# Patient Record
Sex: MALE | Race: WHITE | NOT HISPANIC OR LATINO | URBAN - METROPOLITAN AREA
[De-identification: names, ages, dates, MRNs, and addresses within clinical notes are randomized per-mention and may not be internally consistent; named-entity substitution may affect disease eponyms.]

---

## 2021-10-13 ENCOUNTER — OFFICE VISIT (OUTPATIENT)
Dept: URBAN - METROPOLITAN AREA CLINIC 121 | Facility: CLINIC | Age: 71
End: 2021-10-13

## 2021-10-20 ENCOUNTER — OFFICE VISIT (OUTPATIENT)
Dept: URBAN - METROPOLITAN AREA CLINIC 63 | Facility: CLINIC | Age: 71
End: 2021-10-20

## 2021-11-29 ENCOUNTER — OFFICE VISIT (OUTPATIENT)
Dept: URBAN - METROPOLITAN AREA MEDICAL CENTER 3 | Facility: MEDICAL CENTER | Age: 71
End: 2021-11-29

## 2021-12-07 ENCOUNTER — OFFICE VISIT (OUTPATIENT)
Dept: URBAN - METROPOLITAN AREA CLINIC 63 | Facility: CLINIC | Age: 71
End: 2021-12-07

## 2022-01-18 ENCOUNTER — OFFICE VISIT (OUTPATIENT)
Dept: URBAN - METROPOLITAN AREA CLINIC 63 | Facility: CLINIC | Age: 72
End: 2022-01-18

## 2022-04-28 ENCOUNTER — OFFICE VISIT (OUTPATIENT)
Dept: URBAN - METROPOLITAN AREA CLINIC 63 | Facility: CLINIC | Age: 72
End: 2022-04-28

## 2022-07-09 ENCOUNTER — TELEPHONE ENCOUNTER (OUTPATIENT)
Dept: URBAN - METROPOLITAN AREA CLINIC 121 | Facility: CLINIC | Age: 72
End: 2022-07-09

## 2022-07-09 RX ORDER — ICOSAPENT ETHYL 1000 MG/1
CAPSULE ORAL
Refills: 0 | OUTPATIENT
Start: 2021-12-07 | End: 2022-01-18

## 2022-07-09 RX ORDER — OMEPRAZOLE 40 MG/1
CAPSULE, DELAYED RELEASE ORAL ONCE A DAY
Refills: 0 | OUTPATIENT
Start: 2021-06-15 | End: 2021-10-20

## 2022-07-09 RX ORDER — METOPROLOL TARTRATE 50 MG/1
TABLET, FILM COATED ORAL ONCE A DAY
Refills: 0 | OUTPATIENT
Start: 2021-09-30 | End: 2021-10-20

## 2022-07-09 RX ORDER — ATORVASTATIN CALCIUM 80 MG/1
TABLET, FILM COATED ORAL ONCE A DAY
Refills: 0 | OUTPATIENT
Start: 2021-10-06 | End: 2021-10-20

## 2022-07-09 RX ORDER — NITROGLYCERIN 0.4 MG/1
TABLET SUBLINGUAL ONCE A DAY
Refills: 0 | OUTPATIENT
Start: 2021-09-29 | End: 2021-10-20

## 2022-07-09 RX ORDER — ATORVASTATIN CALCIUM 80 MG/1
TABLET, FILM COATED ORAL ONCE A DAY
Refills: 0 | OUTPATIENT
Start: 2021-10-20 | End: 2021-12-07

## 2022-07-09 RX ORDER — BENZONATATE 100 MG
200 MG CAPSULE ORAL
Refills: 0 | OUTPATIENT
Start: 2022-04-28 | End: 2022-04-28

## 2022-07-09 RX ORDER — PREDNISONE 20 MG/1
TABLET ORAL
Refills: 0 | OUTPATIENT
Start: 2021-10-26 | End: 2021-12-07

## 2022-07-09 RX ORDER — OMEPRAZOLE 40 MG/1
CAPSULE, DELAYED RELEASE ORAL ONCE A DAY
Refills: 0 | OUTPATIENT
Start: 2021-10-20 | End: 2021-12-07

## 2022-07-09 RX ORDER — ICOSAPENT ETHYL 1000 MG/1
CAPSULE ORAL ONCE A DAY
Refills: 0 | OUTPATIENT
Start: 2021-10-01 | End: 2021-10-20

## 2022-07-09 RX ORDER — ATORVASTATIN CALCIUM 80 MG/1
TABLET, FILM COATED ORAL ONCE A DAY
Refills: 0 | OUTPATIENT
Start: 2021-12-07 | End: 2022-01-18

## 2022-07-09 RX ORDER — ICOSAPENT ETHYL 1000 MG/1
CAPSULE ORAL
Refills: 0 | OUTPATIENT
Start: 2021-11-04 | End: 2021-12-07

## 2022-07-09 RX ORDER — BENZONATATE 100 MG
200 MG CAPSULE ORAL
Refills: 0 | OUTPATIENT
Start: 2022-01-18 | End: 2022-04-28

## 2022-07-09 RX ORDER — ICOSAPENT ETHYL 1000 MG/1
CAPSULE ORAL ONCE A DAY
Refills: 0 | OUTPATIENT
Start: 2022-04-28 | End: 2022-04-28

## 2022-07-09 RX ORDER — METOPROLOL TARTRATE 50 MG/1
TABLET, FILM COATED ORAL ONCE A DAY
Refills: 0 | OUTPATIENT
Start: 2021-10-20 | End: 2021-12-07

## 2022-07-09 RX ORDER — OMEPRAZOLE 20 MG/1
ONCE A DAY CAPSULE, DELAYED RELEASE ORAL ONCE A DAY
Refills: 1 | OUTPATIENT
Start: 2021-10-20 | End: 2022-04-28

## 2022-07-10 ENCOUNTER — TELEPHONE ENCOUNTER (OUTPATIENT)
Dept: URBAN - METROPOLITAN AREA CLINIC 121 | Facility: CLINIC | Age: 72
End: 2022-07-10

## 2022-07-10 RX ORDER — RANOLAZINE 500 MG/1
TABLET, EXTENDED RELEASE ORAL ONCE A DAY
Refills: 0 | Status: ACTIVE | COMMUNITY
Start: 2022-04-28

## 2022-07-10 RX ORDER — NITROGLYCERIN 0.4 MG/1
TABLET SUBLINGUAL ONCE A DAY
Refills: 0 | Status: ACTIVE | COMMUNITY
Start: 2021-10-20

## 2022-07-10 RX ORDER — ATORVASTATIN CALCIUM 80 MG/1
TABLET, FILM COATED ORAL ONCE A DAY
Refills: 0 | Status: ACTIVE | COMMUNITY
Start: 2022-01-18

## 2022-07-10 RX ORDER — METOPROLOL TARTRATE 25 MG/1
TABLET, FILM COATED ORAL ONCE A DAY
Refills: 0 | Status: ACTIVE | COMMUNITY
Start: 2022-04-28

## 2022-07-10 RX ORDER — ATORVASTATIN CALCIUM 80 MG/1
TABLET, FILM COATED ORAL ONCE A DAY
Refills: 0 | Status: ACTIVE | COMMUNITY
Start: 2022-04-28

## 2022-07-10 RX ORDER — METOPROLOL TARTRATE 50 MG/1
TABLET, FILM COATED ORAL ONCE A DAY
Refills: 0 | Status: ACTIVE | COMMUNITY
Start: 2021-12-07

## 2022-07-10 RX ORDER — ICOSAPENT ETHYL 1000 MG/1
CAPSULE ORAL ONCE A DAY
Refills: 0 | Status: ACTIVE | COMMUNITY
Start: 2021-10-20

## 2022-07-10 RX ORDER — PANTOPRAZOLE SODIUM 40 MG/1
TABLET, DELAYED RELEASE ORAL ONCE A DAY
Refills: 0 | Status: ACTIVE | COMMUNITY
Start: 2022-04-28

## 2023-02-14 ENCOUNTER — TELEPHONE ENCOUNTER (OUTPATIENT)
Dept: URBAN - METROPOLITAN AREA CLINIC 7 | Facility: CLINIC | Age: 73
End: 2023-02-14

## 2023-07-05 ENCOUNTER — TELEPHONE ENCOUNTER (OUTPATIENT)
Dept: URBAN - METROPOLITAN AREA CLINIC 64 | Facility: CLINIC | Age: 73
End: 2023-07-05

## 2023-08-08 ENCOUNTER — OFFICE VISIT (OUTPATIENT)
Dept: URBAN - METROPOLITAN AREA CLINIC 63 | Facility: CLINIC | Age: 73
End: 2023-08-08

## 2023-08-22 PROBLEM — 197321007: Status: ACTIVE | Noted: 2023-08-22

## 2023-08-23 ENCOUNTER — OFFICE VISIT (OUTPATIENT)
Dept: URBAN - METROPOLITAN AREA CLINIC 63 | Facility: CLINIC | Age: 73
End: 2023-08-23

## 2023-08-24 ENCOUNTER — OFFICE VISIT (OUTPATIENT)
Dept: URBAN - METROPOLITAN AREA CLINIC 63 | Facility: CLINIC | Age: 73
End: 2023-08-24

## 2023-08-29 ENCOUNTER — LAB OUTSIDE AN ENCOUNTER (OUTPATIENT)
Dept: URBAN - METROPOLITAN AREA CLINIC 63 | Facility: CLINIC | Age: 73
End: 2023-08-29

## 2023-08-29 ENCOUNTER — OFFICE VISIT (OUTPATIENT)
Dept: URBAN - METROPOLITAN AREA CLINIC 63 | Facility: CLINIC | Age: 73
End: 2023-08-29
Payer: MEDICARE

## 2023-08-29 ENCOUNTER — WEB ENCOUNTER (OUTPATIENT)
Dept: URBAN - METROPOLITAN AREA CLINIC 63 | Facility: CLINIC | Age: 73
End: 2023-08-29

## 2023-08-29 VITALS
TEMPERATURE: 96 F | HEART RATE: 64 BPM | SYSTOLIC BLOOD PRESSURE: 118 MMHG | BODY MASS INDEX: 34.44 KG/M2 | OXYGEN SATURATION: 96 % | DIASTOLIC BLOOD PRESSURE: 80 MMHG | WEIGHT: 240.6 LBS | HEIGHT: 70 IN

## 2023-08-29 DIAGNOSIS — K76.0 FATTY LIVER: ICD-10-CM

## 2023-08-29 PROCEDURE — 99213 OFFICE O/P EST LOW 20 MIN: CPT | Performed by: PHYSICIAN ASSISTANT

## 2023-08-29 RX ORDER — METOPROLOL TARTRATE 25 MG/1
TABLET, FILM COATED ORAL ONCE A DAY
Refills: 0 | Status: ACTIVE | COMMUNITY
Start: 2022-04-28

## 2023-08-29 RX ORDER — ATORVASTATIN CALCIUM 80 MG/1
TABLET, FILM COATED ORAL ONCE A DAY
Refills: 0 | Status: ACTIVE | COMMUNITY
Start: 2022-04-28

## 2023-08-29 RX ORDER — PANTOPRAZOLE SODIUM 40 MG/1
TABLET, DELAYED RELEASE ORAL ONCE A DAY
Refills: 0 | Status: ACTIVE | COMMUNITY
Start: 2022-04-28

## 2023-08-29 RX ORDER — NITROGLYCERIN 0.4 MG/1
TABLET SUBLINGUAL ONCE A DAY
Refills: 0 | Status: ACTIVE | COMMUNITY
Start: 2021-10-20

## 2023-08-29 RX ORDER — ICOSAPENT ETHYL 1000 MG/1
CAPSULE ORAL ONCE A DAY
Refills: 0 | Status: ACTIVE | COMMUNITY
Start: 2021-10-20

## 2023-08-29 RX ORDER — RANOLAZINE 500 MG/1
TABLET, EXTENDED RELEASE ORAL ONCE A DAY
Refills: 0 | Status: ACTIVE | COMMUNITY
Start: 2022-04-28

## 2023-08-29 NOTE — HPI-TODAY'S VISIT:
Dl is a pleasant 73 year old male who presents today for a follow up   Labs dated 10/24/2022 showed a normal CBC.  Normal renal function.  Normal LFTs.  TSH normal.  Vitamin B12 normal.  Folate normal.  HCV negative   Labs dated 3/22/2022 showed a normal CBC.  PT/INR normal.  Glucose 107 but normal renal function.  Normal LFTs.  Hep A antibody IgM negative.  Hep C antibody negative.  Hep A antibody total nonreactive.    Ultrasound dated 12/30/2021 demonstrated coarsened hepatic echotexture indicative of diffuse hepatocellular disease such as fatty infiltration.  No other abnormality    FibroScan dated 12/29/2021 showed S1 and F0    Colonoscopy dated 11/29/2021 showed a redundant colon.  Diminutive tubular adenoma removed from the mid ascending colon.  Diminutive tubular adenoma removed from the hepatic flexure.  Mild internal hemorrhoids on retroflexion.    EGD in October 2020 which revealed LA grade A reflux esophagitis.  Irregular Z-line.  Small hiatal hernia.  Erythematous mucosa in the antrum and prepyloric region.  Erythematous duodenopathy.  Biopsies revealed chronic reflux esophagogastritis with no evidence of intestinal metaplasia, dysplasia or malignancy. Reviewed with Dr. Haywood and no plans to repeat endoscopy at this time    Chronic reflux well controlled on famotidine 20 mg PRN. Following with ENT and dysphonia improved. Notes 1-2 daily bowel movements without constipation or diarrhea. Denies any nausea, vomiting, dysphagia, odynophagia, hematemesis, coffee ground emesis, abdominal pain, change in bowel habits, abnormal weight loss, BRBPR or melena. Denies any family history of colon cancer or colon polyps. Notes no other GI complaints at this time

## 2023-10-27 ENCOUNTER — OFFICE VISIT (OUTPATIENT)
Dept: URBAN - METROPOLITAN AREA CLINIC 63 | Facility: CLINIC | Age: 73
End: 2023-10-27
Payer: MEDICARE

## 2023-10-27 ENCOUNTER — LAB OUTSIDE AN ENCOUNTER (OUTPATIENT)
Dept: URBAN - METROPOLITAN AREA CLINIC 63 | Facility: CLINIC | Age: 73
End: 2023-10-27

## 2023-10-27 VITALS
BODY MASS INDEX: 34.07 KG/M2 | DIASTOLIC BLOOD PRESSURE: 70 MMHG | HEART RATE: 62 BPM | SYSTOLIC BLOOD PRESSURE: 117 MMHG | TEMPERATURE: 97.6 F | HEIGHT: 70 IN | WEIGHT: 238 LBS | OXYGEN SATURATION: 95 %

## 2023-10-27 DIAGNOSIS — K76.0 FATTY LIVER: ICD-10-CM

## 2023-10-27 DIAGNOSIS — K74.01 HEPATIC FIBROSIS, EARLY FIBROSIS: ICD-10-CM

## 2023-10-27 PROCEDURE — 99214 OFFICE O/P EST MOD 30 MIN: CPT | Performed by: PHYSICIAN ASSISTANT

## 2023-10-27 RX ORDER — ATORVASTATIN CALCIUM 80 MG/1
TABLET, FILM COATED ORAL ONCE A DAY
Refills: 0 | Status: ACTIVE | COMMUNITY
Start: 2022-04-28

## 2023-10-27 RX ORDER — RANOLAZINE 500 MG/1
TABLET, EXTENDED RELEASE ORAL ONCE A DAY
Refills: 0 | Status: ACTIVE | COMMUNITY
Start: 2022-04-28

## 2023-10-27 RX ORDER — METOPROLOL TARTRATE 25 MG/1
TABLET, FILM COATED ORAL ONCE A DAY
Refills: 0 | Status: ACTIVE | COMMUNITY
Start: 2022-04-28

## 2023-10-27 RX ORDER — NITROGLYCERIN 0.4 MG/1
TABLET SUBLINGUAL ONCE A DAY
Refills: 0 | Status: ACTIVE | COMMUNITY
Start: 2021-10-20

## 2023-10-27 RX ORDER — PANTOPRAZOLE SODIUM 40 MG/1
TABLET, DELAYED RELEASE ORAL ONCE A DAY
Refills: 0 | Status: ACTIVE | COMMUNITY
Start: 2022-04-28

## 2023-10-27 RX ORDER — ICOSAPENT ETHYL 1000 MG/1
CAPSULE ORAL ONCE A DAY
Refills: 0 | Status: ACTIVE | COMMUNITY
Start: 2021-10-20

## 2023-10-27 NOTE — HPI-TODAY'S VISIT:
Dl is a pleasant 73 year old male who presents today for a follow up   FibroScan dated 10/17/2023 showed S2 and F2.  Moderate liver steatosis slightly worsened without significant hepatic fibrosis  Ultrasound dated 10/17/2023 showed findings of hepatic steatosis otherwise no acute abnormality  Labs dated 9/21/2023 showed a normal hemoglobin.  Normal platelets.  Total bilirubin 1.2 otherwise normal LFTs.  Normal renal function.  TSH normal.  FibroScan dated 12/29/2021 showed S1 and F0    Colonoscopy dated 11/29/2021 showed a redundant colon.  Diminutive tubular adenoma removed from the mid ascending colon.  Diminutive tubular adenoma removed from the hepatic flexure.  Mild internal hemorrhoids on retroflexion.    EGD in October 2020 which revealed LA grade A reflux esophagitis.  Irregular Z-line.  Small hiatal hernia.  Erythematous mucosa in the antrum and prepyloric region.  Erythematous duodenopathy.  Biopsies revealed chronic reflux esophagogastritis with no evidence of intestinal metaplasia, dysplasia or malignancy. Reviewed with Dr. Haywood and no plans to repeat endoscopy at this time    Chronic reflux well controlled on famotidine 20 mg PRN. Notes 1-2 daily bowel movements without constipation or diarrhea. Denies any nausea, vomiting, dysphagia, odynophagia, hematemesis, coffee ground emesis, abdominal pain, change in bowel habits, abnormal weight loss, BRBPR or melena. Denies any family history of colon cancer or colon polyps. Notes no other GI complaints at this time

## 2023-11-06 ENCOUNTER — TELEPHONE ENCOUNTER (OUTPATIENT)
Dept: URBAN - METROPOLITAN AREA CLINIC 63 | Facility: CLINIC | Age: 73
End: 2023-11-06

## 2023-11-06 ENCOUNTER — WEB ENCOUNTER (OUTPATIENT)
Dept: URBAN - METROPOLITAN AREA CLINIC 63 | Facility: CLINIC | Age: 73
End: 2023-11-06

## 2023-12-15 ENCOUNTER — OFFICE VISIT (OUTPATIENT)
Dept: URBAN - METROPOLITAN AREA CLINIC 63 | Facility: CLINIC | Age: 73
End: 2023-12-15

## 2024-01-12 ENCOUNTER — DASHBOARD ENCOUNTERS (OUTPATIENT)
Age: 74
End: 2024-01-12

## 2024-01-12 ENCOUNTER — OFFICE VISIT (OUTPATIENT)
Dept: URBAN - METROPOLITAN AREA CLINIC 63 | Facility: CLINIC | Age: 74
End: 2024-01-12
Payer: MEDICARE

## 2024-01-12 VITALS
HEIGHT: 70 IN | WEIGHT: 242 LBS | HEART RATE: 73 BPM | TEMPERATURE: 96.9 F | DIASTOLIC BLOOD PRESSURE: 74 MMHG | BODY MASS INDEX: 34.65 KG/M2 | OXYGEN SATURATION: 97 % | SYSTOLIC BLOOD PRESSURE: 126 MMHG

## 2024-01-12 DIAGNOSIS — K76.0 FATTY LIVER: ICD-10-CM

## 2024-01-12 DIAGNOSIS — K74.01 HEPATIC FIBROSIS, EARLY FIBROSIS: ICD-10-CM

## 2024-01-12 PROCEDURE — 99213 OFFICE O/P EST LOW 20 MIN: CPT | Performed by: PHYSICIAN ASSISTANT

## 2024-01-12 RX ORDER — RANOLAZINE 500 MG/1
TABLET, EXTENDED RELEASE ORAL ONCE A DAY
Refills: 0 | COMMUNITY
Start: 2022-04-28

## 2024-01-12 RX ORDER — NITROGLYCERIN 0.4 MG/1
TABLET SUBLINGUAL ONCE A DAY
Refills: 0 | COMMUNITY
Start: 2021-10-20

## 2024-01-12 RX ORDER — PANTOPRAZOLE SODIUM 40 MG/1
TABLET, DELAYED RELEASE ORAL ONCE A DAY
Refills: 0 | COMMUNITY
Start: 2022-04-28

## 2024-01-12 RX ORDER — METOPROLOL TARTRATE 25 MG/1
TABLET, FILM COATED ORAL ONCE A DAY
Refills: 0 | COMMUNITY
Start: 2022-04-28

## 2024-01-12 RX ORDER — ICOSAPENT ETHYL 1000 MG/1
CAPSULE ORAL ONCE A DAY
Refills: 0 | COMMUNITY
Start: 2021-10-20

## 2024-01-12 RX ORDER — ATORVASTATIN CALCIUM 80 MG/1
TABLET, FILM COATED ORAL ONCE A DAY
Refills: 0 | COMMUNITY
Start: 2022-04-28

## 2024-01-12 NOTE — HPI-TODAY'S VISIT:
Dl is a pleasant 73 year old male who presents today for a follow up   FibroScan done with Dr Aguilar 1/10/24 showed , 5.2 kpa. Normal LFTs  MRI abdomen/liver with and without contrast dated 10/28/2023 showed no evidence of hepatic steatosis or fibrosis (abnormal results of the prior FibroScan may have been secondary to inadequate fasting status or artifact).  FibroScan dated 10/17/2023 showed S2 and F2.  Moderate liver steatosis slightly worsened without significant hepatic fibrosis  Ultrasound dated 10/17/2023 showed findings of hepatic steatosis otherwise no acute abnormality  Labs dated 9/21/2023 showed a normal hemoglobin.  Normal platelets.  Total bilirubin 1.2 otherwise normal LFTs.  Normal renal function.  TSH normal.  FibroScan dated 12/29/2021 showed S1 and F0    Colonoscopy dated 11/29/2021 showed a redundant colon.  Diminutive tubular adenoma removed from the mid ascending colon.  Diminutive tubular adenoma removed from the hepatic flexure.  Mild internal hemorrhoids on retroflexion.    EGD in October 2020 which revealed LA grade A reflux esophagitis.  Irregular Z-line.  Small hiatal hernia.  Erythematous mucosa in the antrum and prepyloric region.  Erythematous duodenopathy.  Biopsies revealed chronic reflux esophagogastritis with no evidence of intestinal metaplasia, dysplasia or malignancy. Reviewed with Dr. Haywood and no plans to repeat endoscopy at this time    Chronic reflux well controlled on famotidine 20 mg PRN. Notes 1-2 daily bowel movements without constipation or diarrhea. Denies any nausea, vomiting, dysphagia, odynophagia, hematemesis, coffee ground emesis, abdominal pain, change in bowel habits, abnormal weight loss, BRBPR or melena. Denies any family history of colon cancer or colon polyps. Notes no other GI complaints at this time

## 2024-05-23 ENCOUNTER — TELEPHONE ENCOUNTER (OUTPATIENT)
Dept: URBAN - METROPOLITAN AREA CLINIC 63 | Facility: CLINIC | Age: 74
End: 2024-05-23